# Patient Record
Sex: FEMALE | Race: WHITE | NOT HISPANIC OR LATINO | Employment: FULL TIME | URBAN - METROPOLITAN AREA
[De-identification: names, ages, dates, MRNs, and addresses within clinical notes are randomized per-mention and may not be internally consistent; named-entity substitution may affect disease eponyms.]

---

## 2018-03-28 ENCOUNTER — CONVERSION ENCOUNTER (OUTPATIENT)
Dept: GENERAL RADIOLOGY | Facility: HOSPITAL | Age: 33
End: 2018-03-28

## 2018-11-27 ENCOUNTER — APPOINTMENT (OUTPATIENT)
Dept: GENERAL RADIOLOGY | Facility: HOSPITAL | Age: 33
End: 2018-11-27

## 2018-11-27 ENCOUNTER — HOSPITAL ENCOUNTER (EMERGENCY)
Facility: HOSPITAL | Age: 33
Discharge: HOME | End: 2018-11-27
Admitting: EMERGENCY MEDICINE

## 2018-11-27 VITALS
BODY MASS INDEX: 28.88 KG/M2 | DIASTOLIC BLOOD PRESSURE: 82 MMHG | SYSTOLIC BLOOD PRESSURE: 130 MMHG | OXYGEN SATURATION: 99 % | RESPIRATION RATE: 16 BRPM | HEIGHT: 69 IN | HEART RATE: 73 BPM | TEMPERATURE: 98.2 F | WEIGHT: 195 LBS
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
## 2018-11-27 DIAGNOSIS — S46.912A ELBOW STRAIN, LEFT, INITIAL ENCOUNTER: ICD-10-CM

## 2018-11-27 DIAGNOSIS — M53.3 COCCYX PAIN: ICD-10-CM

## 2018-11-27 PROCEDURE — 72220 X-RAY EXAM SACRUM TAILBONE: CPT

## 2018-11-27 PROCEDURE — 99283 EMERGENCY DEPT VISIT LOW MDM: CPT

## 2018-11-27 PROCEDURE — 73080 X-RAY EXAM OF ELBOW: CPT

## 2018-11-27 RX ORDER — HYDROCODONE BITARTRATE AND ACETAMINOPHEN 5; 325 MG/1; MG/1
1 TABLET ORAL EVERY 6 HOURS PRN
Qty: 12 TABLET | Refills: 0 | Status: SHIPPED | OUTPATIENT
Start: 2018-11-27 | End: 2022-02-07

## 2018-11-27 RX ORDER — HYDROCODONE BITARTRATE AND ACETAMINOPHEN 5; 325 MG/1; MG/1
1 TABLET ORAL ONCE
Status: COMPLETED | OUTPATIENT
Start: 2018-11-27 | End: 2018-11-27

## 2018-11-27 RX ADMIN — HYDROCODONE BITARTRATE AND ACETAMINOPHEN 1 TABLET: 5; 325 TABLET ORAL at 10:31

## 2018-11-27 NOTE — ED NOTES
Pt was at work this morning walking down some stairs to a meeting. She slipped and fell down around 5 steps. She c/o elbow and buttock pain. She states that a co-worker said he saw her hit her head. The patient denies this though, she did report one episode of nausea. Pt given ice pack in triage.      Shauna Llamas RN  11/27/18 5905

## 2018-11-27 NOTE — ED PROVIDER NOTES
Pt presents to the ED c/o left elbow and tailbone pain s/p mechanical fall earlier today.     On exam, pt's musculoskeletal has TTP of L elbow with good ROM..     XR L Elbow and XR Sacrum and Coccyx negative.  Plan for discharge, she is on mobic at home.    MD ATTESTATION NOTE    The CARLOS A and I have discussed this patient's history, physical exam, and treatment plan.  I have reviewed the documentation and personally had a face to face interaction with the patient. I affirm the documentation and agree with the treatment and plan.  The attached note describes my personal findings.    Documentation assistance provided by rebecca Stone for Dr. Harrison. Information recorded by the scribe was done at my direction and has been verified and validated by me.     Magui Stone  11/27/18 3762       Inocente Harrison MD  11/27/18 5383

## 2018-11-27 NOTE — ED PROVIDER NOTES
"EMERGENCY DEPARTMENT ENCOUNTER    CHIEF COMPLAINT  Chief Complaint: Fall  History given by: Patient, Friend  History limited by: N/A  Room Number: 04/04  PMD: Radha Holman, IRIS      HPI:  Pt is a 32 y.o. female who presents to the ED c/o left elbow pain and left buttock pain s/p mechanical fall sustained earlier today. Pt reports that she slipped while pt was walking at work. Pt reports that she sustained mild head injury during the fall, but denies headache, LOC, abdominal pain, chest pain, dyspnea, vomiting, neck pain, dizziness/lightheadedness, and visual difficulties. However, pt has had mild nausea. There are no other complaints at this time.       Duration: Fall occurred earlier today  Timing: Pain is intermittent  Location: Left elbow, left buttock  Radiation: None  Quality: \"aching\"  Intensity/Severity: Moderate  Progression: Pain waxes and wanes  Associated Symptoms: Nausea, head injury   Aggravating Factors: Nothing  Alleviating Factors: Nothing  Previous Episodes: Unknown  Treatment before arrival: Unknown        PAST MEDICAL HISTORY  Active Ambulatory Problems     Diagnosis Date Noted   • No Active Ambulatory Problems     Resolved Ambulatory Problems     Diagnosis Date Noted   • No Resolved Ambulatory Problems     No Additional Past Medical History       PAST SURGICAL HISTORY  History reviewed. No pertinent surgical history.    FAMILY HISTORY  History reviewed. No pertinent family history.    SOCIAL HISTORY  Social History     Socioeconomic History   • Marital status:      Spouse name: Not on file   • Number of children: Not on file   • Years of education: Not on file   • Highest education level: Not on file   Social Needs   • Financial resource strain: Not on file   • Food insecurity - worry: Not on file   • Food insecurity - inability: Not on file   • Transportation needs - medical: Not on file   • Transportation needs - non-medical: Not on file   Occupational History   • Not on file "   Tobacco Use   • Smoking status: Former Smoker   • Smokeless tobacco: Never Used   Substance and Sexual Activity   • Alcohol use: Yes     Comment: occ   • Drug use: No   • Sexual activity: Not on file   Other Topics Concern   • Not on file   Social History Narrative   • Not on file         ALLERGIES  Penicillins        REVIEW OF SYSTEMS  Review of Systems   Constitutional: Negative.    Eyes: Negative for visual disturbance.   Respiratory: Negative for shortness of breath.    Cardiovascular: Negative for chest pain.   Gastrointestinal: Positive for nausea. Negative for abdominal pain and vomiting.   Musculoskeletal: Negative for neck pain.        Left elbow pain, left buttock pain, head injury   Skin: Negative.    Neurological: Negative for dizziness, syncope, weakness, light-headedness, numbness and headaches.   Psychiatric/Behavioral: Negative.    All other systems reviewed and are negative.          PHYSICAL EXAM  ED Triage Vitals   Temp Heart Rate Resp BP SpO2   11/27/18 0935 11/27/18 0935 11/27/18 0935 11/27/18 0944 11/27/18 0935   99.2 °F (37.3 °C) 73 16 130/82 99 %      Temp src Heart Rate Source Patient Position BP Location FiO2 (%)   11/27/18 0935 11/27/18 0935 11/27/18 0944 -- --   Tympanic Monitor Sitting         Physical Exam   Constitutional: She is oriented to person, place, and time. No distress.   HENT:   Head: Normocephalic and atraumatic.   Eyes: EOM are normal. Pupils are equal, round, and reactive to light.   Neck: Normal range of motion. Neck supple.   Cardiovascular: Normal rate and intact distal pulses.   Pulmonary/Chest: Effort normal. She exhibits no tenderness.   Abdominal: Soft. There is no tenderness.   Musculoskeletal: She exhibits tenderness (of the left elbow with decreased ROM secondary to pain).   C-Spine, T-Spine, and L-Spine are nontender.      Neurological: She is alert and oriented to person, place, and time. She has normal sensation and normal strength.   Skin: Skin is warm.    Psychiatric: Mood and affect normal.   Nursing note and vitals reviewed.        RADIOLOGY  XR Sacrum & Coccyx     FINDINGS: Frontal and lateral projections of the sacrum and coccyx  demonstrate no evidence for acute fracture or bony malalignment.      XR Elbow 3+ View Left     FINDINGS: 2 radiographic views of the left elbow demonstrate no evidence  for acute fracture or bony malalignment.          I ordered the above noted radiological studies and reviewed the images on the PACS system.          PROGRESS AND CONSULTS    10:34 AM:  Henry ordered to treat for pain. Left elbow X-Ray and sacral X-Ray ordered for further evaluation.     11:28 AM:  Rechecked pt. Pt is resting comfortably and appears in no acute distress. Informed pt that her sacral X-Ray and left elbow X-Ray are negative acute. Pt will be prescribed with rx for small amount of pain medicine for discomfort. Pt was advised to f/u with PMD. RTER warnings given. Pt agrees with plan for discharge.    11:30 AM:  Reviewed pt's history and workup with Dr. Harrison (ER physician). After a bedside evaluation, Dr. Harrison agrees with the plan of care.    11:32 AM:  Cirilo report [id number] reviewed.  Risks, benefits, alternatives discussed with patient. Pt consents to treatment and agrees to follow up with PMD tomorrow for further care and any other prescriptions.         COURSE & MEDICAL DECISION MAKING  Pertinent Labs and/or imaging studies that were ordered and reviewed are noted above. Results were reviewed/discussed with the patient and they were also made aware of online assess. Pt also made aware that some labs, such as cultures, will not be resulted during ER visit and follow up with PMD is necessary.         MEDICATIONS GIVEN IN ER  Medications   HYDROcodone-acetaminophen (NORCO) 5-325 MG per tablet 1 tablet (1 tablet Oral Given 11/27/18 1031)             DISPOSITION VITALS  /82 (Patient Position: Sitting)   Pulse 73   Temp 98.2 °F (36.8 °C) (Oral)    "Resp 16   Ht 175.3 cm (69\")   Wt 88.5 kg (195 lb)   SpO2 99%   BMI 28.80 kg/m²           DIAGNOSIS  Final diagnoses:   Fall, initial encounter   Elbow strain, left, initial encounter   Coccyx pain             DISPOSITION  Pt discharged.    DISCHARGE    Patient discharged in stable condition.    Reviewed implications of results, diagnosis, meds, responsibility to follow up, warning signs and symptoms of possible worsening, potential complications and reasons to return to ER.    Patient/Family voiced understanding of above instructions.    Discussed plan for discharge, as there is no emergent indication for admission. Pt/family is agreeable and understands need for follow up and repeat testing. Pt is aware that discharge does not mean that nothing is wrong but it indicates no emergency is present that requires admission and they must continue care with follow-up as given below or physician of their choice.     FOLLOW-UP  Radha Holman, IRIS  [address] [facility] [address]  [place] [address]  [phone]    Schedule an appointment as soon as possible for a visit            Medication List      New Prescriptions    HYDROcodone-acetaminophen 5-325 MG per tablet  Commonly known as:  NORCO  Take 1 tablet by mouth Every 6 (Six) Hours As Needed for Severe Pain .                    I personally reviewed the past medical history, past surgical history, social history, family history, current medications and allergies as they appear in this chart.  The scribe's note accurately reflects the work and decisions made by me.     Documentation assistance provided by rebecca Boateng for IRIS Vance.  Information recorded by the scribe was done at my direction and has been verified and validated by me.        Oswaldo Boateng  11/27/18 1148       Viki Palma APRN  11/27/18 1416    "

## 2020-04-20 ENCOUNTER — OFFICE VISIT CONVERTED (OUTPATIENT)
Dept: OTHER | Facility: HOSPITAL | Age: 35
End: 2020-04-20
Attending: NURSE PRACTITIONER

## 2021-03-08 ENCOUNTER — OFFICE VISIT CONVERTED (OUTPATIENT)
Dept: FAMILY MEDICINE CLINIC | Facility: CLINIC | Age: 36
End: 2021-03-08
Attending: NURSE PRACTITIONER

## 2021-04-19 ENCOUNTER — OFFICE VISIT CONVERTED (OUTPATIENT)
Dept: FAMILY MEDICINE CLINIC | Facility: CLINIC | Age: 36
End: 2021-04-19
Attending: NURSE PRACTITIONER

## 2021-05-10 NOTE — H&P
History and Physical      Patient Name: Cecilia Jay   Patient ID: [id number]   Sex: Female   YOB: 1985    Primary Care Provider: Radha SIMMONS   Referring Provider: Radha SIMMONS    Visit Date: March 8, 2021    Provider: IRIS Aggarwal   Location: [facility]   Location Address: [address] [place] [address], [address]  [place], [place]  [id number]   Location Phone: [phone]          Chief Complaint  · New patient - Establish care  · wrist pain  · Annual Physical Exam  · focusing difficulties      History Of Present Illness  Cecilia Jay is a 35 year old female who presents for evaluation and treatment of:      Annual Physical Exam  New patient to re-establish new primary care.  Patient previously seen at [facility].    Patient has no significant medical history.    Patient complaining of bilateral wrist pain.  Patient states she is on disability from the  for same d/t repetative use.  Patient states she is loosing strength in both hands.  Patient has not had previous treatment or surgery on her wrists. Pt admits she does do a significant amount of typing daily, she states she also has numbness and tingling    Patient complaining of not being able to stay focused or concentrate when people are talking to her.  Patient states she also has difficulty staying focused with her work as an  as well.  Patient is requesting to be tested/evaluated for ADD/ADHD.     Pap: 2019, patient is scheduled with [facility] for annual pap this afternoon.         Past Surgical History  Procedure Name Date Notes   breast reduction --  --    Laproscopic freeing of intestinal adhesions --  --    Tubal ligation --  --          Allergy List  Allergen Name Date Reaction Notes   PENICILLINS --  --  --        Allergies Reconciled  Family Medical History  Disease Name Relative/Age Notes   Family history of Alzheimer's disease  --          Social History  Finding Status Start/Stop Quantity  "Notes   Alcohol Current some day --/-- 8 /week --    Sedentary --  --/-- --  --    Tobacco Former 12/23 1 ppd --          Immunizations  NameDate Admin Mfg Trade Name Lot Number Route Inj VIS Given VIS Publication   Recqbnzgs55/10/2020 [employer]  NE NE 03/08/2021    Comments:          Review of Systems  · Constitutional  o Admits  o : trouble focusing  o Denies  o : fatigue  · Eyes  o Denies  o : blurred vision, changes in vision  · HENT  o Denies  o : headaches  · Cardiovascular  o Denies  o : chest pain, irregular heart beats, rapid heart rate, dyspnea on exertion  · Respiratory  o Denies  o : shortness of breath, wheezing, cough  · Gastrointestinal  o Denies  o : nausea, vomiting, diarrhea, constipation, abdominal pain, blood in stools, melena  · Genitourinary  o Denies  o : frequency, dysuria, hematuria  · Integument  o Denies  o : rash, new skin lesions  · Musculoskeletal  o Admits  o : wrist pain  o Denies  o : joint pain, joint swelling, muscle pain  · Endocrine  o Denies  o : polyuria, polydipsia      Vitals  Date Time BP Position Site L\R Cuff Size HR RR TEMP (F) WT  HT  BMI kg/m2 BSA m2 O2 Sat FR L/min FiO2 HC       03/08/2021 10:49 /83 Sitting    60 - R  97.9 241lbs 6oz 5'  9\" 35.64 2.31 99 %  21%          Physical Examination  · Constitutional  o Appearance  o : well developed, well-nourished, no acute distress  · Head and Face  o Head  o : normocephalic, atraumatic  · Neck  o Inspection/Palpation  o : normal appearance, no masses or tenderness, trachea midline  o Thyroid  o : gland size normal, nontender, no nodules or masses present on palpation  · Respiratory  o Respiratory Effort  o : breathing unlabored  o Inspection of Chest  o : chest rise symmetric bilaterally  o Auscultation of Lungs  o : clear to auscultation bilaterally throughout inspiration and expiration  · Cardiovascular  o Heart  o :   § Auscultation of Heart  § : regular rate and rhythm, no murmurs, gallops or " rubs  o Peripheral Vascular System  o :   § Extremities  § : no edema  · Lymphatic  o Neck  o : no cervical lymphadenopathy, no supraclavicular lymphadenopathy  · Psychiatric  o Mood and Affect  o : mood normal, affect appropriate     bilateral wrists-positive Tinels sign, negative phalens test,  strength equal, positive radial pulses.           Assessment  · Screening for depression     V79.0/Z13.89  · Annual physical exam     V70.0/Z00.00  · Establishing care with new doctor, encounter for     V65.8/Z76.89  · Routine lab draw     V72.60/Z01.89  · Left wrist pain     719.43/M25.532  · Right wrist pain     719.43/M25.531  · Screening for thyroid disorder     V77.0/Z13.29  · Screening for cardiovascular condition     V81.2/Z13.6  · Bilateral hand numbness     782.0/R20.0  check bilateral upper extremity nerve conduction studies  · Attention or concentration deficit     799.51/R41.840  refer to psych for formal evaluation    Problems Reconciled  Plan  · Orders  o ACO-18: Positive screen for clinical depression using a standardized tool and a follow-up plan documented () - V79.0/Z13.89 - 03/08/2021   13  o Physical, Primary Care Panel (CBC, CMP, Lipid, TSH) [facility] (97827, 58213, [id number], 57531) - V77.0/Z13.29, V81.2/Z13.6, V65.8/Z76.89, V72.60/Z01.89 - 03/08/2021  o ACO-14: Influenza immunization administered or previously received [facility] () - - 03/08/2021  o ACO-39: Current medications updated and reviewed (, 1159F) - - 03/08/2021  o EMG/NCV of Upper Extremities Bilaterally (30102) - 719.43/M25.532, 719.43/M25.531, 782.0/R20.0 - 03/08/2021  o PSYCHOLOGY CONSULTATION (PSYCO) - 799.51/R41.840 - 03/08/2021   formal ADD evaluation  · Medications  o Medications have been Reconciled  o Transition of Care or Provider Policy  · Instructions  o Depression Screen completed and scanned into the EMR under the designated folder within the patient's documents.  o Today's PHQ-9 result is _13__denies depression, states she  is having trouble focusing, will refer to psych provider  o Reviewed health maintenance flowsheet and updated information. Orders were placed and/or patient's response was documented.  o Patient was educated/instructed on their diagnosis, treatment and medications prior to discharge from the clinic today.  o Call the office with any concerns or questions.  o Electronically Identified Patient Education Materials Provided Electronically  · Disposition  o Follow Up PRN            Electronically Signed by: IRIS Aggarwal -Author on March 11, 2021 08:13:33 PM

## 2021-05-10 NOTE — H&P
History and Physical      Patient Name: Cecilia Jay   Patient ID: [id number]   Sex: Female   YOB: 1985        Visit Date: April 20, 2020    Provider: IRIS Taylor   [facility]   Location Address: [address] [place] [address]  [id number]   Location Phone: [phone]          Chief Complaint  · Evaluation for Respiratory Virus      History Of Present Illness  Cecilia Jya is a 34 year old female who presents today to the clinic for evaluation of a respiratory virus.   Date of Onset: 04/14/2020   What Symptoms: cough, fatigue , fever, and shortness of breath   Quality of Symptoms: dry cough that has worsened since Tuesday   Anything make it worse or better: Mucinex and Zyrtec helps some   Severity of Symptoms: moderate   Any known Exposure to COVID-19: no      Patient presents the respiratory virus evaluation center with cough, fatigue, mild fever, shortness of breath, sinus pressure since Tuesday.  Patient reports the fevers been up to 100 degrees.  Patient has been taking Mucinex and Zyrtec over-the-counter with minimal relief.  No known exposure COVID-19 or other illness.       Medication List  Mucinex 600 mg oral tablet extended release 12hr         Allergy List  PENICILLINS       Allergies Reconciled  Review of Systems  · Constitutional  o Admits  o : fatigue, fever  o Denies  o : weight gain, weight loss  · HENT  o Admits  o : sinus pain, nasal congestion, nasal discharge  o Denies  o : vertigo, recent head injury, sore throat  · Respiratory  o Admits  o : shortness of breath, cough  o Denies  o : asthma  · Gastrointestinal  o Denies  o : nausea, vomiting, diarrhea, constipation, abdominal pain  · Integument  o Denies  o : rash  · Neurologic  o Denies  o : headache      Vitals  Date Time BP Position Site L\R Cuff Size HR RR TEMP (F) WT  HT  BMI kg/m2 BSA m2 O2 Sat HC       04/20/2020 02:28 PM      94 - R  97.5     98 %          Physical  Examination  · Constitutional  o Appearance  o : no acute distress, well-nourished  · Head and Face  o Head  o :   § Inspection  § : atraumatic, normocephalic  · Eyes  o Eyes  o : extraocular movements intact, no scleral icterus, no conjunctival injection  · Ears, Nose, Mouth and Throat  o Ears  o :   § External Ears  § : normal  § Otoscopic Examination  § : normal tympanic membrane exam  o Nose  o :   § Intranasal Exam  § : nasal mucosa inflammation present, frontal sinus tenderness to percussion and palpation  o Oral Cavity  o :   § Oral Mucosa  § : moist mucous membranes  o Throat  o :   § Oropharynx  § : normal tonsils, normal oropharynx, clear drainage  · Respiratory  o Respiratory Effort  o : breathing comfortably, symmetric chest rise  o Auscultation of Lungs  o : clear to asculatation bilaterally, no wheezes, rales, or rhonchii  · Cardiovascular  o Heart  o :   § Auscultation of Heart  § : regular rate and rhythm, no murmurs, rubs, or gallops  o Peripheral Vascular System  o :   § Extremities  § : no edema  · Lymphatic  o Neck  o : no lymphadenopathy present  o Supraclavicular Nodes  o : no supraclavicular nodes  · Skin and Subcutaneous Tissue  o General Inspection  o : normal inspection  · Neurologic  o Mental Status Examination  o :   § Orientation  § : grossly oriented to person, place and time  o Gait and Station  o :   § Gait Screening  § : normal gait  · Psychiatric  o General  o : normal mood and affect              Assessment  · Cough     786.2/R05  · Sinusitis     473.9/J32.9  Patient prescribed Omnicef and Mucinex at this time for sinus infection. Educated on supportive care to include rest, fluids, Tylenol and Motrin as needed. Educated patient to call or return to clinic with any new or worsening symptoms.    Problems Reconciled  Plan  · Medications  o cefdinir 300 mg oral capsule   SIG: take 1 capsule (300 mg) by oral route every 12 hours for 10 days   DISP: (20) capsules with 0  refills  Prescribed on 04/20/2020     o Medications have been Reconciled  o Transition of Care or Provider Policy  · Instructions  o Chronic conditions reviewed and taken into consideration for today's treatment plan.   o Plan of Care:   o Patient instructed to seek medical attention urgently for new or worsening symptoms.  o Patient was educated on their diagnosis, treatment, and medications today.   o Recommend self monitoring. Instructions given.   o Self-monitoring for 14 days. Instructions given.  o Stay home until without fever for 72 hours without using fever-reducing medications and other symptoms are gone.  o Electronically Identified Patient Education Materials Provided Electronically  · Disposition  o Call or Return if symptoms worsen or persist.  o Meds sent to pharmacy  o As Needed for Follow Up            Electronically Signed by: Leti De La Torre APRN -Author on April 20, 2020 02:48:35 PM

## 2021-05-14 VITALS
HEIGHT: 69 IN | SYSTOLIC BLOOD PRESSURE: 117 MMHG | WEIGHT: 230 LBS | DIASTOLIC BLOOD PRESSURE: 79 MMHG | TEMPERATURE: 98.2 F | HEART RATE: 74 BPM | OXYGEN SATURATION: 98 % | RESPIRATION RATE: 18 BRPM | BODY MASS INDEX: 34.07 KG/M2

## 2021-05-14 VITALS
SYSTOLIC BLOOD PRESSURE: 117 MMHG | TEMPERATURE: 97.9 F | DIASTOLIC BLOOD PRESSURE: 83 MMHG | WEIGHT: 241.37 LBS | OXYGEN SATURATION: 99 % | HEART RATE: 60 BPM | BODY MASS INDEX: 35.75 KG/M2 | HEIGHT: 69 IN

## 2021-05-14 NOTE — PROGRESS NOTES
Progress Note      Patient Name: Cecilia Jay   Patient ID: [id number]   Sex: Female   YOB: 1985    Primary Care Provider: Radha SIMMONS   Referring Provider: Radha SIMMONS    Visit Date: April 19, 2021    Provider: IRIS Parker   Location: [facility]   Location Address: [address] [place] [address], [address]  [place], [place]  [id number]   Location Phone: [phone]          Chief Complaint  · poss sinus infection      History Of Present Illness  Cecilia Jay is a 35 year old female who presents for evaluation and treatment of:      Patient is here for a possible sinus infection. Patient thought that she had allergies last weekend, but now is coughing up green mucus (starting in the middle of last week). She has also had some sinus congestion and drainage, blowing out green drainage, mostly in the mornings.  Patient admits that she did have a low grade fever last week and took tylenol for it.   Patient denies any SOA, nausea, vomiting. Patient denies any COVID-19 exposure.    Patient has been taking Mucinex DM, Claritin, Theraflu, etc.     Patient has not had a flu vaccine.       Past Surgical History  Procedure Name Date Notes   breast reduction --  --    Laproscopic freeing of intestinal adhesions --  --    Tubal ligation --  --          Medication List  Name Date Started Instructions   Mucinex DM  mg oral tablet extended release 12 hr  take 1 tablet by oral route every 12 hours as needed         Allergy List  Allergen Name Date Reaction Notes   PENICILLINS --  --  --        Allergies Reconciled  Family Medical History  Disease Name Relative/Age Notes   Family history of Alzheimer's disease  --          Social History  Finding Status Start/Stop Quantity Notes   Alcohol Current some day --/-- 8 /week --    Sedentary --  --/-- --  --    Tobacco Former 12/23 1 ppd --          Immunizations  NameDate Admin Mfg Trade Name Lot Number Route Inj VIS Given VIS  "Publication   [id number] [employer] Fluzone Quadrivalent  NE NE 03/08/2021    Comments:          Review of Systems  · Constitutional  o Denies  o : fever, fatigue, weight loss, weight gain  · Cardiovascular  o Denies  o : lower extremity edema, claudication, chest pressure, palpitations  · Respiratory  o Admits  o : cough  o Denies  o : shortness of breath, wheezing, hemoptysis, dyspnea on exertion  · Gastrointestinal  o Denies  o : nausea, vomiting, diarrhea, constipation, abdominal pain      Vitals  Date Time BP Position Site L\R Cuff Size HR RR TEMP (F) WT  HT  BMI kg/m2 BSA m2 O2 Sat FR L/min FiO2 HC       04/19/2021 01:47 /79 Sitting    74 - R 18 98.2 230lbs 0oz 5'  9\" 33.96 2.25 98 %  21%          Physical Examination  · Constitutional  o Appearance  o : well developed, well-nourished, no acute distress  · Head and Face  o Head  o : normocephalic, atraumatic  · Ears, Nose, Mouth and Throat  o Ears  o :   § External Ears  § : external auditory canal appearance normal, no discharge present  § Otoscopic Examination  § : tympanic membranes pearly white/gray bilaterally  o Nose  o :   § External Nose  § : no lesions noted  § Nasopharynx  § : swollen, clear d/c noted   o Oral Cavity  o :   § Oral Mucosa  § : oral mucosa light pink  o Throat  o :   § Oropharynx  § : tonsils without exudate, no palatal petechiae, thick PND noted   · Neck  o Inspection/Palpation  o : normal appearance, no masses or tenderness, trachea midline  o Thyroid  o : gland size normal, nontender, no nodules or masses present on palpation  · Respiratory  o Respiratory Effort  o : breathing unlabored  o Inspection of Chest  o : chest rise symmetric bilaterally  o Auscultation of Lungs  o : slight expiratory wheezing noted   · Cardiovascular  o Heart  o :   § Auscultation of Heart  § : regular rate and rhythm, no murmurs, gallops or rubs  o Peripheral Vascular System  o :   § Extremities  § : no edema  · Lymphatic  o Neck  o : no cervical " lymphadenopathy, no supraclavicular lymphadenopathy  · Psychiatric  o Mood and Affect  o : mood normal, affect appropriate          Assessment  · Allergic rhinitis due to allergen     477.9/J30.9  · Cough     786.2/R05  · Upper respiratory infection     465.9/J06.9  · Sinus drainage     478.19/J34.89  · Sinus congestion     478.19/R09.81      Plan  · Orders  o ACO-14: Influenza immunization was not administered for reasons documented [facility] () - - 04/19/2021   pt declined  o ACO-39: Current medications updated and reviewed (1159F, ) - - 04/19/2021  · Medications  o Medrol (Scotty) 4 mg oral tablets,dose pack   SIG: take by oral route as directed per package instructions   DISP: (1) Package with 0 refills  Prescribed on 04/19/2021     o azithromycin 250 mg oral tablet   SIG: take 2 tablets (500 mg) by oral route once daily for 1 day then 1 tablet (250 mg) by oral route once daily for 4 days   DISP: (6) Tablet with 0 refills  Prescribed on 04/19/2021     o albuterol sulfate 90 mcg/actuation inhalation HFA aerosol inhaler   SIG: inhale 2 puffs (180 mcg) by inhalation route every 4-6 hours as needed   DISP: (1) Inhaler with 0 refills  Prescribed on 04/19/2021     · Instructions  o Take all medications as prescribed/directed.  o Patient was educated/instructed on their diagnosis, treatment and medications prior to discharge from the clinic today.  o Patient instructed to seek medical attention urgently for new or worsening symptoms.  o Call the office with any concerns or questions.  o Chronic conditions reviewed and taken into consideration for today's treatment plan.  · Disposition  o Call or Return if symptoms worsen or persist.  o Follow Up PRN            Electronically Signed by: IRIS Parker -Author on April 19, 2021 02:21:15 PM

## 2021-05-15 VITALS — OXYGEN SATURATION: 98 % | HEART RATE: 94 BPM | TEMPERATURE: 97.5 F

## 2022-02-07 ENCOUNTER — OFFICE VISIT (OUTPATIENT)
Dept: FAMILY MEDICINE CLINIC | Facility: CLINIC | Age: 37
End: 2022-02-07

## 2022-02-07 ENCOUNTER — LAB (OUTPATIENT)
Dept: LAB | Facility: HOSPITAL | Age: 37
End: 2022-02-07

## 2022-02-07 VITALS
HEIGHT: 69 IN | HEART RATE: 72 BPM | OXYGEN SATURATION: 98 % | SYSTOLIC BLOOD PRESSURE: 110 MMHG | DIASTOLIC BLOOD PRESSURE: 75 MMHG | WEIGHT: 234 LBS | BODY MASS INDEX: 34.66 KG/M2

## 2022-02-07 DIAGNOSIS — Z13.29 SCREENING FOR THYROID DISORDER: ICD-10-CM

## 2022-02-07 DIAGNOSIS — Z13.220 SCREENING FOR LIPID DISORDERS: ICD-10-CM

## 2022-02-07 DIAGNOSIS — Z00.00 ANNUAL PHYSICAL EXAM: Primary | ICD-10-CM

## 2022-02-07 DIAGNOSIS — R20.0 BILATERAL HAND NUMBNESS: ICD-10-CM

## 2022-02-07 DIAGNOSIS — Z11.59 NEED FOR HEPATITIS C SCREENING TEST: ICD-10-CM

## 2022-02-07 DIAGNOSIS — Z00.00 ANNUAL PHYSICAL EXAM: ICD-10-CM

## 2022-02-07 LAB
ALBUMIN SERPL-MCNC: 4.3 G/DL (ref 3.5–5.2)
ALBUMIN/GLOB SERPL: 1.7 G/DL
ALP SERPL-CCNC: 56 U/L (ref 39–117)
ALT SERPL W P-5'-P-CCNC: 17 U/L (ref 1–33)
ANION GAP SERPL CALCULATED.3IONS-SCNC: 9.6 MMOL/L (ref 5–15)
AST SERPL-CCNC: 14 U/L (ref 1–32)
BASOPHILS # BLD AUTO: 0.04 10*3/MM3 (ref 0–0.2)
BASOPHILS NFR BLD AUTO: 0.5 % (ref 0–1.5)
BILIRUB SERPL-MCNC: 0.4 MG/DL (ref 0–1.2)
BUN SERPL-MCNC: 13 MG/DL (ref 6–20)
BUN/CREAT SERPL: 14.9 (ref 7–25)
CALCIUM SPEC-SCNC: 9 MG/DL (ref 8.6–10.5)
CHLORIDE SERPL-SCNC: 104 MMOL/L (ref 98–107)
CHOLEST SERPL-MCNC: 169 MG/DL (ref 0–200)
CO2 SERPL-SCNC: 25.4 MMOL/L (ref 22–29)
CREAT SERPL-MCNC: 0.87 MG/DL (ref 0.57–1)
DEPRECATED RDW RBC AUTO: 45.6 FL (ref 37–54)
EOSINOPHIL # BLD AUTO: 0.09 10*3/MM3 (ref 0–0.4)
EOSINOPHIL NFR BLD AUTO: 1 % (ref 0.3–6.2)
ERYTHROCYTE [DISTWIDTH] IN BLOOD BY AUTOMATED COUNT: 12.8 % (ref 12.3–15.4)
GFR SERPL CREATININE-BSD FRML MDRD: 73 ML/MIN/1.73
GLOBULIN UR ELPH-MCNC: 2.6 GM/DL
GLUCOSE SERPL-MCNC: 98 MG/DL (ref 65–99)
HCT VFR BLD AUTO: 39.3 % (ref 34–46.6)
HCV AB SER DONR QL: NORMAL
HDLC SERPL-MCNC: 57 MG/DL (ref 40–60)
HGB BLD-MCNC: 12.8 G/DL (ref 12–15.9)
IMM GRANULOCYTES # BLD AUTO: 0.02 10*3/MM3 (ref 0–0.05)
IMM GRANULOCYTES NFR BLD AUTO: 0.2 % (ref 0–0.5)
LDLC SERPL CALC-MCNC: 93 MG/DL (ref 0–100)
LDLC/HDLC SERPL: 1.59 {RATIO}
LYMPHOCYTES # BLD AUTO: 1.63 10*3/MM3 (ref 0.7–3.1)
LYMPHOCYTES NFR BLD AUTO: 19 % (ref 19.6–45.3)
MCH RBC QN AUTO: 31.1 PG (ref 26.6–33)
MCHC RBC AUTO-ENTMCNC: 32.6 G/DL (ref 31.5–35.7)
MCV RBC AUTO: 95.6 FL (ref 79–97)
MONOCYTES # BLD AUTO: 0.77 10*3/MM3 (ref 0.1–0.9)
MONOCYTES NFR BLD AUTO: 9 % (ref 5–12)
NEUTROPHILS NFR BLD AUTO: 6.03 10*3/MM3 (ref 1.7–7)
NEUTROPHILS NFR BLD AUTO: 70.3 % (ref 42.7–76)
NRBC BLD AUTO-RTO: 0 /100 WBC (ref 0–0.2)
PLATELET # BLD AUTO: 231 10*3/MM3 (ref 140–450)
PMV BLD AUTO: 12 FL (ref 6–12)
POTASSIUM SERPL-SCNC: 4.4 MMOL/L (ref 3.5–5.2)
PROT SERPL-MCNC: 6.9 G/DL (ref 6–8.5)
RBC # BLD AUTO: 4.11 10*6/MM3 (ref 3.77–5.28)
SODIUM SERPL-SCNC: 139 MMOL/L (ref 136–145)
TRIGL SERPL-MCNC: 108 MG/DL (ref 0–150)
TSH SERPL DL<=0.05 MIU/L-ACNC: 1.57 UIU/ML (ref 0.27–4.2)
VLDLC SERPL-MCNC: 19 MG/DL (ref 5–40)
WBC NRBC COR # BLD: 8.58 10*3/MM3 (ref 3.4–10.8)

## 2022-02-07 PROCEDURE — 80050 GENERAL HEALTH PANEL: CPT

## 2022-02-07 PROCEDURE — 36415 COLL VENOUS BLD VENIPUNCTURE: CPT

## 2022-02-07 PROCEDURE — 86803 HEPATITIS C AB TEST: CPT

## 2022-02-07 PROCEDURE — 80061 LIPID PANEL: CPT

## 2022-02-07 PROCEDURE — 99395 PREV VISIT EST AGE 18-39: CPT | Performed by: NURSE PRACTITIONER

## 2022-02-07 NOTE — PROGRESS NOTES
"Chief Complaint  Annual Exam    Subjective          Cecilia Jay presents to [facility]  History of Present Illness  Pt here for annual physical exam with screening labwork.    She is c/o bilateral hand numbness. She has had this for quite a while and was initially referred for EMG study but patient states she never got a notice about the test and she never followed up.  She states the right hand is worse and she is starting to lose  strength in her right hand.      History reviewed. No pertinent past medical history.      Allergies   Allergen Reactions   • Penicillins Anaphylaxis          History reviewed. No pertinent surgical history.       Social History     Tobacco Use   • Smoking status: Former Smoker   • Smokeless tobacco: Never Used   Substance Use Topics   • Alcohol use: Yes     Comment: occ         History reviewed. No pertinent family history.       Current Outpatient Medications on File Prior to Visit   Medication Sig   • [DISCONTINUED] HYDROcodone-acetaminophen (NORCO) 5-325 MG per tablet Take 1 tablet by mouth Every 6 (Six) Hours As Needed for Severe Pain .     No current facility-administered medications on file prior to visit.         Immunization History   Administered Date(s) Administered   • COVID-19 (PFIZER) PURPLE CAP 04/18/2021, 05/10/2021, 11/18/2021         /75   Pulse 72   Ht 175.3 cm (69\")   Wt 106 kg (234 lb)   SpO2 98%   BMI 34.56 kg/m²             Physical Exam  Vitals reviewed.   Constitutional:       Appearance: Normal appearance. She is well-developed.   HENT:      Head: Normocephalic and atraumatic.      Right Ear: External ear normal.      Left Ear: External ear normal.      Mouth/Throat:      Pharynx: No oropharyngeal exudate.   Eyes:      Conjunctiva/sclera: Conjunctivae normal.      Pupils: Pupils are equal, round, and reactive to light.   Cardiovascular:      Rate and Rhythm: Normal rate and regular rhythm.      Heart sounds: No " murmur heard.  No friction rub. No gallop.    Pulmonary:      Effort: Pulmonary effort is normal.      Breath sounds: Normal breath sounds. No wheezing or rhonchi.   Abdominal:      General: Abdomen is flat. Bowel sounds are normal.      Palpations: Abdomen is soft.   Musculoskeletal:      Right wrist: Snuff box tenderness present.      Right hand: Decreased strength.      Left hand: Normal.      Comments: Negative Phalen's and Tinel's sign   Skin:     General: Skin is warm and dry.   Neurological:      Mental Status: She is alert and oriented to person, place, and time.      Cranial Nerves: No cranial nerve deficit.   Psychiatric:         Mood and Affect: Mood and affect normal.         Behavior: Behavior normal.         Thought Content: Thought content normal.         Judgment: Judgment normal.             Result Review :                           Assessment and Plan      Diagnoses and all orders for this visit:    1. Annual physical exam (Primary)  -     Comprehensive Metabolic Panel; Future  -     CBC & Differential; Future    2. Need for hepatitis C screening test  -     Hepatitis C antibody; Future    3. Screening for lipid disorders  -     Lipid Panel; Future    4. Screening for thyroid disorder  -     TSH; Future    5. Bilateral hand numbness  -     EMG & Nerve Conduction Test; Future    The patient is advised to attempt to lose weight and return for routine annual checkups.            Follow Up     Return for Next scheduled follow up.    Patient was given instructions and counseling regarding her condition or for health maintenance advice. Please see specific information pulled into the AVS if appropriate.

## 2022-02-08 ENCOUNTER — TELEPHONE (OUTPATIENT)
Dept: FAMILY MEDICINE CLINIC | Facility: CLINIC | Age: 37
End: 2022-02-08

## 2022-02-15 ENCOUNTER — OFFICE VISIT (OUTPATIENT)
Dept: FAMILY MEDICINE CLINIC | Facility: CLINIC | Age: 37
End: 2022-02-15

## 2022-02-15 VITALS
TEMPERATURE: 98.4 F | DIASTOLIC BLOOD PRESSURE: 71 MMHG | WEIGHT: 233.4 LBS | BODY MASS INDEX: 34.57 KG/M2 | SYSTOLIC BLOOD PRESSURE: 108 MMHG | HEART RATE: 70 BPM | HEIGHT: 69 IN | OXYGEN SATURATION: 98 %

## 2022-02-15 DIAGNOSIS — Z01.419 ENCOUNTER FOR ANNUAL ROUTINE GYNECOLOGICAL EXAMINATION: Primary | ICD-10-CM

## 2022-02-15 DIAGNOSIS — Z12.4 SCREENING FOR CERVICAL CANCER: ICD-10-CM

## 2022-02-15 DIAGNOSIS — R20.0 BILATERAL HAND NUMBNESS: ICD-10-CM

## 2022-02-15 PROCEDURE — G0123 SCREEN CERV/VAG THIN LAYER: HCPCS

## 2022-02-15 PROCEDURE — 99395 PREV VISIT EST AGE 18-39: CPT | Performed by: NURSE PRACTITIONER

## 2022-02-15 PROCEDURE — 87624 HPV HI-RISK TYP POOLED RSLT: CPT | Performed by: NURSE PRACTITIONER

## 2022-02-15 NOTE — PROGRESS NOTES
"Subjective   History of Present Illness    Cecilia Jay is a 36 y.o. female who presents for annual exam.  WWE with pap smear  Obstetric History:  OB History        2    Para   1    Term                AB   1    Living           SAB        IAB        Ectopic   1    Molar        Multiple        Live Births                   Menstrual History:  Menarche Age: 10 years  Patient's last menstrual period was 2022.       Sexual History:         Current contraception: none  History of abnormal Pap smear: yes - As a teenager had HPV  SRIDEVI exposure in utero: no  Received Gardasil immunization: yes - at age 24  Perform regular self breast exam: no  Family history of uterine or ovarian cancer: no  Family History of cervical cancer: no  Family History of colon cancer/colon polyps: no  Regular self breast exam: no  History of abnormal mammogram: no  Family history of breast cancer: no  History of abnormal lipids: no        Review of Systems   Neurological: Positive for numbness.     She continues to complain of bilateral hand numbness.  She was previously referred for EMG and nerve conduction study approximately .  She states she is still not been contacted regarding that appointment.  She is requesting another referral.   Musculoskeletal:positive for bilateral hand numbness     Objective   Physical Exam    /71 (BP Location: Left arm, Patient Position: Sitting, Cuff Size: Adult)   Pulse 70   Temp 98.4 °F (36.9 °C) (Oral)   Ht 175.3 cm (69\")   Wt 106 kg (233 lb 6.4 oz)   LMP 2022   SpO2 98%   BMI 34.47 kg/m²     General:   alert, appears stated age and cooperative   Heart: regular rate and rhythm, S1, S2 normal, no murmur, click, rub or gallop   Lungs: clear to auscultation bilaterally   Abdomen: soft, non-tender, without masses or organomegaly   Breast: inspection negative, no nipple discharge or bleeding, no masses or nodularity palpable   Vulva: normal   Vagina: normal mucosa, " normal discharge   Cervix: retroverted   Uterus: normal size   Adnexa: normal adnexa and no mass, fullness, tenderness     Assessment/Plan   Diagnoses and all orders for this visit:    1. Encounter for annual routine gynecological examination (Primary)  -     IGP, Aptima HPV, Rfx 16 / 18,45; Future  -     IGP, Aptima HPV, Rfx 16 / 18,45    2. Screening for cervical cancer  -     IGP, Aptima HPV, Rfx 16 / 18,45; Future  -     IGP, Aptima HPV, Rfx 16 / 18,45    3. Bilateral hand numbness  -     EMG & Nerve Conduction Test; Future        All questions answered.  Await pap smear results.  Breast self exam technique reviewed and patient encouraged to perform self-exam monthly.  Discussed healthy lifestyle modifications.  Follow up as needed.  Thin prep Pap smear.

## 2022-02-18 LAB
CYTOLOGIST CVX/VAG CYTO: NORMAL
CYTOLOGY CVX/VAG DOC CYTO: NORMAL
CYTOLOGY CVX/VAG DOC THIN PREP: NORMAL
DX ICD CODE: NORMAL
HIV 1 & 2 AB SER-IMP: NORMAL
HPV I/H RISK 4 DNA CVX QL PROBE+SIG AMP: NEGATIVE
OTHER STN SPEC: NORMAL
STAT OF ADQ CVX/VAG CYTO-IMP: NORMAL

## 2022-02-22 ENCOUNTER — TELEPHONE (OUTPATIENT)
Dept: FAMILY MEDICINE CLINIC | Facility: CLINIC | Age: 37
End: 2022-02-22

## 2022-02-22 NOTE — TELEPHONE ENCOUNTER
----- Message from IRIS Godinez sent at 2/21/2022  7:43 AM EST -----  Normal Pap, negative for HPV.  Repeat in 3 years.

## 2022-08-17 ENCOUNTER — OFFICE VISIT (OUTPATIENT)
Dept: FAMILY MEDICINE CLINIC | Facility: CLINIC | Age: 37
End: 2022-08-17

## 2022-08-17 VITALS
BODY MASS INDEX: 33.56 KG/M2 | OXYGEN SATURATION: 99 % | HEART RATE: 51 BPM | WEIGHT: 226.6 LBS | SYSTOLIC BLOOD PRESSURE: 122 MMHG | TEMPERATURE: 98.2 F | DIASTOLIC BLOOD PRESSURE: 81 MMHG | HEIGHT: 69 IN

## 2022-08-17 DIAGNOSIS — M25.511 ACUTE PAIN OF RIGHT SHOULDER: ICD-10-CM

## 2022-08-17 DIAGNOSIS — M54.2 NECK PAIN: Primary | ICD-10-CM

## 2022-08-17 PROCEDURE — 99213 OFFICE O/P EST LOW 20 MIN: CPT | Performed by: NURSE PRACTITIONER

## 2022-08-17 NOTE — PROGRESS NOTES
"Chief Complaint  Neck Pain and Shoulder Pain    SUBJECTIVE  Cecilia Jay presents to [facility]     Patient complaining of neck pain, right shoulder pain since Friday.  Patient states she was raking gravel when she heard a pop and subsequently has had constant pain she describes as burning that increases with any ROM. She reports decreased ROM and she is unable to lift her arm above shoulder level. States she has had some radiation of pain down her arm. Denies numbness/paresthesias.  Patient states she went to [facility] in [place] 8/13/22, right shoulder xray shows no abnormalities.  Patient got a prescription of Medrol Pack from a friend and has been taking that.  Pt has been taking Kamran ASA and medrol dose pack. Tomorrow will be her last day of the dose pack.   History of Present Illness  History reviewed. No pertinent past medical history.   History reviewed. No pertinent family history.   Past Surgical History:   Procedure Laterality Date   • BILATERAL BREAST REDUCTION     • TUBAL ABDOMINAL LIGATION     • WISDOM TOOTH EXTRACTION        No current outpatient medications on file.    OBJECTIVE  Vital Signs:   /81 (BP Location: Left arm, Patient Position: Sitting, Cuff Size: Adult)   Pulse 51   Temp 98.2 °F (36.8 °C) (Oral)   Ht 175.3 cm (69\")   Wt 103 kg (226 lb 9.6 oz)   SpO2 99%   BMI 33.46 kg/m²    Estimated body mass index is 33.46 kg/m² as calculated from the following:    Height as of this encounter: 175.3 cm (69\").    Weight as of this encounter: 103 kg (226 lb 9.6 oz).     Wt Readings from Last 3 Encounters:   08/17/22 103 kg (226 lb 9.6 oz)   08/11/22 102 kg (225 lb)   02/15/22 106 kg (233 lb 6.4 oz)     BP Readings from Last 3 Encounters:   08/17/22 122/81   08/11/22 125/84   02/15/22 108/71       Physical Exam  Vitals reviewed.   Constitutional:       Appearance: Normal appearance. She is well-developed.   HENT:      Head: Normocephalic and " atraumatic.      Right Ear: External ear normal.      Left Ear: External ear normal.      Mouth/Throat:      Pharynx: No oropharyngeal exudate.   Eyes:      Conjunctiva/sclera: Conjunctivae normal.      Pupils: Pupils are equal, round, and reactive to light.   Cardiovascular:      Rate and Rhythm: Normal rate and regular rhythm.      Heart sounds: No murmur heard.    No friction rub. No gallop.   Pulmonary:      Effort: Pulmonary effort is normal.      Breath sounds: Normal breath sounds. No wheezing or rhonchi.   Musculoskeletal:      Right shoulder: Normal.      Left shoulder: Normal.      Cervical back: Pain with movement present. Decreased range of motion.   Skin:     General: Skin is warm and dry.   Neurological:      Mental Status: She is alert and oriented to person, place, and time.      Cranial Nerves: No cranial nerve deficit.   Psychiatric:         Mood and Affect: Mood and affect normal.         Behavior: Behavior normal.         Thought Content: Thought content normal.         Judgment: Judgment normal.          Result Review        XR Knee 1 or 2 View Right    Result Date: 8/11/2022   Negative right knee series.      LAUREN SMALL MD       Electronically Signed and Approved By: LAUREN SMALL MD on 8/11/2022 at 16:37              XR shoulder comp min 2 views right      Narrative  Performed by [facility]  INDICATION:  Posterior shoulder pain after shoveling rock.     TECHNIQUE:  Three views of the right shoulder.     COMPARISON:  None Available.     FINDINGS:     There is no displaced fracture.  The alignment is anatomic. Humeral head and   surgical neck are intact. Glenoid humeral joint alignment is maintained. No soft   tissue abnormality is seen.     IMPRESSION:   No acute fracture or malalignment.       Signed by BRANDON Polo DO   ##### Final #####     Dictated by:    JULES POLO DO-RAD   Dictated DT/TM: 08/13/2022 9:52 am   Interpreted and electronically signed by:  JULES POLO  BRENTON, DO-RAD   Signed DT/TM:  08/13/2022 9:55 am    Procedure Note    Jules Polo DO - 08/13/2022   Formatting of this note might be different from the original.   INDICATION:  Posterior shoulder pain after shoveling rock.     TECHNIQUE:  Three views of the right shoulder.     COMPARISON:  None Available.     FINDINGS:     There is no displaced fracture.  The alignment is anatomic. Humeral head and   surgical neck are intact. Glenoid humeral joint alignment is maintained. No soft   tissue abnormality is seen.     IMPRESSION:   No acute fracture or malalignment.       Signed by BRANDON Polo DO   ##### Final #####     Dictated by:    JULES POLO DO-RAD   Dictated DT/TM: 08/13/2022 9:52 am   Interpreted and electronically signed by:  JULES POLO DO-RAD   Signed DT/TM:  08/13/2022 9:55 am    The above data has been reviewed by IRIS Godinez 08/17/2022 08:58 EDT.          Patient Care Team:  Radha Holman APRN as PCP - General (Family Medicine)        ASSESSMENT & PLAN    Diagnoses and all orders for this visit:    1. Neck pain (Primary)  -     XR Spine Cervical Complete 4 or 5 View; Future    2. Acute pain of right shoulder         Tobacco Use: Medium Risk   • Smoking Tobacco Use: Former Smoker   • Smokeless Tobacco Use: Never Used       Follow Up     Return if symptoms worsen or fail to improve.      Patient was given instructions and counseling regarding her condition or for health maintenance advice. Please see specific information pulled into the AVS if appropriate.   I have reviewed information obtained and documented by others and I have confirmed the accuracy of this documented note.    IRIS Godinez

## 2022-08-18 ENCOUNTER — HOSPITAL ENCOUNTER (OUTPATIENT)
Dept: GENERAL RADIOLOGY | Facility: HOSPITAL | Age: 37
Discharge: HOME | End: 2022-08-18
Admitting: NURSE PRACTITIONER

## 2022-08-18 DIAGNOSIS — M54.2 NECK PAIN: ICD-10-CM

## 2022-08-18 PROCEDURE — 72050 X-RAY EXAM NECK SPINE 4/5VWS: CPT

## 2022-09-13 ENCOUNTER — HOSPITAL ENCOUNTER (OUTPATIENT)
Dept: MRI IMAGING | Facility: HOSPITAL | Age: 37
Discharge: HOME | End: 2022-09-13
Admitting: NURSE PRACTITIONER

## 2022-09-13 DIAGNOSIS — M54.2 NECK PAIN: ICD-10-CM

## 2022-09-13 PROCEDURE — 72141 MRI NECK SPINE W/O DYE: CPT

## 2022-09-14 DIAGNOSIS — M54.2 CERVICAL PAIN (NECK): Primary | ICD-10-CM

## 2022-09-28 ENCOUNTER — OFFICE VISIT (OUTPATIENT)
Dept: NEUROSURGERY | Facility: CLINIC | Age: 37
End: 2022-09-28

## 2022-09-28 VITALS
HEIGHT: 69 IN | WEIGHT: 230.2 LBS | BODY MASS INDEX: 34.1 KG/M2 | DIASTOLIC BLOOD PRESSURE: 86 MMHG | SYSTOLIC BLOOD PRESSURE: 121 MMHG

## 2022-09-28 DIAGNOSIS — M50.30 DEGENERATIVE DISC DISEASE, CERVICAL: ICD-10-CM

## 2022-09-28 DIAGNOSIS — M79.18 CERVICAL MYOFASCIAL PAIN SYNDROME: Primary | ICD-10-CM

## 2022-09-28 DIAGNOSIS — M54.2 CERVICALGIA: ICD-10-CM

## 2022-09-28 DIAGNOSIS — G93.0 ARACHNOID CYST OF POSTERIOR CRANIAL FOSSA: ICD-10-CM

## 2022-09-28 PROCEDURE — 99215 OFFICE O/P EST HI 40 MIN: CPT | Performed by: NURSE PRACTITIONER

## 2022-09-28 RX ORDER — ASPIRIN,CAFFEINE 35.5; 5 MG/1; MG/1
TABLET ORAL
COMMUNITY
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
## 2022-12-09 ENCOUNTER — TELEPHONE (OUTPATIENT)
Dept: FAMILY MEDICINE CLINIC | Facility: CLINIC | Age: 37
End: 2022-12-09

## 2022-12-09 NOTE — TELEPHONE ENCOUNTER
----- Message from Cecilia Jay sent at 12/8/2022  8:22 PM EST -----  Regarding: Massage therapy   Contact: [phone]  Rm Garcia,  I’ve been using massage therapy for my neck and should for over a year now. I use my [employer] card. My therapist said that after the first of the year they are being made to charge sales tax. She said if I have a note from my provider it is good for one year. Is that something you could do?    Thanks,  Cecilia

## 2022-12-09 NOTE — TELEPHONE ENCOUNTER
Phoned patient advising her that Radha is out of the office and will be back in on Dec 199. She stated that she needs the letter in Jan and she will send a my chart message then for her to get the letter because it cannot be dated in 2022.

## 2023-01-11 ENCOUNTER — TELEPHONE (OUTPATIENT)
Dept: FAMILY MEDICINE CLINIC | Facility: CLINIC | Age: 38
End: 2023-01-11
Payer: COMMERCIAL

## 2023-01-11 NOTE — TELEPHONE ENCOUNTER
----- Message from Bushra Ryan sent at 1/11/2023  9:01 AM EST -----  Regarding: FW: Sales Tax, Massage Therapy  Contact: [phone] -hi Radha,  I’ve been using massage therapy for my neck and should for over a year now. I use my [employer] card. My therapist said that after the first of the year they are being made to charge sales tax. She said if I have a note from my provider it is good for one year. Is that something you could do?     Thanks,  Cecilia

## 2023-01-18 NOTE — TELEPHONE ENCOUNTER
I called patient, she states she just needs a letter in [facility] stating that she needs to have the massage therapy for her chronic neck and shoulder pain.

## 2023-04-04 ENCOUNTER — OFFICE VISIT (OUTPATIENT)
Dept: FAMILY MEDICINE CLINIC | Facility: CLINIC | Age: 38
End: 2023-04-04
Payer: COMMERCIAL

## 2023-04-04 VITALS
WEIGHT: 217 LBS | HEART RATE: 56 BPM | DIASTOLIC BLOOD PRESSURE: 69 MMHG | OXYGEN SATURATION: 100 % | BODY MASS INDEX: 32.14 KG/M2 | HEIGHT: 69 IN | SYSTOLIC BLOOD PRESSURE: 111 MMHG

## 2023-04-04 DIAGNOSIS — R40.0 DAYTIME SOMNOLENCE: ICD-10-CM

## 2023-04-04 DIAGNOSIS — Z00.00 ANNUAL PHYSICAL EXAM: Primary | ICD-10-CM

## 2023-04-04 PROCEDURE — 99395 PREV VISIT EST AGE 18-39: CPT | Performed by: NURSE PRACTITIONER

## 2023-04-04 PROCEDURE — 99213 OFFICE O/P EST LOW 20 MIN: CPT | Performed by: NURSE PRACTITIONER
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
## 2023-04-13 PROCEDURE — 87081 CULTURE SCREEN ONLY: CPT | Performed by: STUDENT IN AN ORGANIZED HEALTH CARE EDUCATION/TRAINING PROGRAM

## 2023-04-16 ENCOUNTER — TELEPHONE (OUTPATIENT)
Dept: URGENT CARE | Facility: CLINIC | Age: 38
End: 2023-04-16
Payer: COMMERCIAL

## 2023-04-16 NOTE — TELEPHONE ENCOUNTER
----- Message from IRIS Ackerman sent at 4/16/2023  1:31 PM EDT -----  Please notify patient of negative beta strep test result.

## 2023-07-17 PROBLEM — E66.9 CLASS 1 OBESITY: Status: ACTIVE | Noted: 2023-07-17

## 2023-07-17 PROBLEM — R06.83 SNORING: Status: ACTIVE | Noted: 2023-07-17

## 2023-07-17 PROBLEM — E66.811 CLASS 1 OBESITY: Status: ACTIVE | Noted: 2023-07-17

## 2023-07-17 PROBLEM — G47.30 OBSERVED SLEEP APNEA: Status: ACTIVE | Noted: 2023-07-17

## 2023-07-17 PROBLEM — G47.19 EXCESSIVE DAYTIME SLEEPINESS: Status: ACTIVE | Noted: 2023-07-17

## 2023-07-17 PROBLEM — G47.8 NON-RESTORATIVE SLEEP: Status: ACTIVE | Noted: 2023-07-17

## 2023-07-24 ENCOUNTER — LAB (OUTPATIENT)
Dept: LAB | Facility: HOSPITAL | Age: 38
End: 2023-07-24
Payer: COMMERCIAL

## 2023-07-24 ENCOUNTER — HOSPITAL ENCOUNTER (OUTPATIENT)
Dept: SLEEP MEDICINE | Facility: HOSPITAL | Age: 38
Discharge: HOME | End: 2023-07-24
Admitting: INTERNAL MEDICINE
Payer: COMMERCIAL

## 2023-07-24 DIAGNOSIS — G47.30 OBSERVED SLEEP APNEA: ICD-10-CM

## 2023-07-24 DIAGNOSIS — G47.8 NON-RESTORATIVE SLEEP: ICD-10-CM

## 2023-07-24 DIAGNOSIS — E66.9 CLASS 1 OBESITY: ICD-10-CM

## 2023-07-24 DIAGNOSIS — R06.83 SNORING: ICD-10-CM

## 2023-07-24 DIAGNOSIS — Z00.00 ANNUAL PHYSICAL EXAM: ICD-10-CM

## 2023-07-24 LAB
ALBUMIN SERPL-MCNC: 4.9 G/DL (ref 3.5–5.2)
ALBUMIN/GLOB SERPL: 1.9 G/DL
ALP SERPL-CCNC: 61 U/L (ref 39–117)
ALT SERPL W P-5'-P-CCNC: 18 U/L (ref 1–33)
ANION GAP SERPL CALCULATED.3IONS-SCNC: 11 MMOL/L (ref 5–15)
AST SERPL-CCNC: 18 U/L (ref 1–32)
BASOPHILS # BLD AUTO: 0.03 10*3/MM3 (ref 0–0.2)
BASOPHILS NFR BLD AUTO: 0.6 % (ref 0–1.5)
BILIRUB SERPL-MCNC: 0.3 MG/DL (ref 0–1.2)
BUN SERPL-MCNC: 11 MG/DL (ref 6–20)
BUN/CREAT SERPL: 12.2 (ref 7–25)
CALCIUM SPEC-SCNC: 9.5 MG/DL (ref 8.6–10.5)
CHLORIDE SERPL-SCNC: 104 MMOL/L (ref 98–107)
CHOLEST SERPL-MCNC: 187 MG/DL (ref 0–200)
CO2 SERPL-SCNC: 25 MMOL/L (ref 22–29)
CREAT SERPL-MCNC: 0.9 MG/DL (ref 0.57–1)
DEPRECATED RDW RBC AUTO: 42.3 FL (ref 37–54)
EGFRCR SERPLBLD CKD-EPI 2021: 83.6 ML/MIN/1.73
EOSINOPHIL # BLD AUTO: 0.06 10*3/MM3 (ref 0–0.4)
EOSINOPHIL NFR BLD AUTO: 1.2 % (ref 0.3–6.2)
ERYTHROCYTE [DISTWIDTH] IN BLOOD BY AUTOMATED COUNT: 12.4 % (ref 12.3–15.4)
GLOBULIN UR ELPH-MCNC: 2.6 GM/DL
GLUCOSE SERPL-MCNC: 93 MG/DL (ref 65–99)
HCT VFR BLD AUTO: 41.3 % (ref 34–46.6)
HDLC SERPL-MCNC: 58 MG/DL (ref 40–60)
HGB BLD-MCNC: 13.8 G/DL (ref 12–15.9)
IMM GRANULOCYTES # BLD AUTO: 0.01 10*3/MM3 (ref 0–0.05)
IMM GRANULOCYTES NFR BLD AUTO: 0.2 % (ref 0–0.5)
LDLC SERPL CALC-MCNC: 102 MG/DL (ref 0–100)
LDLC/HDLC SERPL: 1.68 {RATIO}
LYMPHOCYTES # BLD AUTO: 1.84 10*3/MM3 (ref 0.7–3.1)
LYMPHOCYTES NFR BLD AUTO: 37.2 % (ref 19.6–45.3)
MCH RBC QN AUTO: 31 PG (ref 26.6–33)
MCHC RBC AUTO-ENTMCNC: 33.4 G/DL (ref 31.5–35.7)
MCV RBC AUTO: 92.8 FL (ref 79–97)
MONOCYTES # BLD AUTO: 0.44 10*3/MM3 (ref 0.1–0.9)
MONOCYTES NFR BLD AUTO: 8.9 % (ref 5–12)
NEUTROPHILS NFR BLD AUTO: 2.56 10*3/MM3 (ref 1.7–7)
NEUTROPHILS NFR BLD AUTO: 51.9 % (ref 42.7–76)
NRBC BLD AUTO-RTO: 0 /100 WBC (ref 0–0.2)
PLATELET # BLD AUTO: 277 10*3/MM3 (ref 140–450)
PMV BLD AUTO: 11.9 FL (ref 6–12)
POTASSIUM SERPL-SCNC: 4.2 MMOL/L (ref 3.5–5.2)
PROT SERPL-MCNC: 7.5 G/DL (ref 6–8.5)
RBC # BLD AUTO: 4.45 10*6/MM3 (ref 3.77–5.28)
SODIUM SERPL-SCNC: 140 MMOL/L (ref 136–145)
TRIGL SERPL-MCNC: 158 MG/DL (ref 0–150)
TSH SERPL DL<=0.05 MIU/L-ACNC: 1.79 UIU/ML (ref 0.27–4.2)
VLDLC SERPL-MCNC: 27 MG/DL (ref 5–40)
WBC NRBC COR # BLD: 4.94 10*3/MM3 (ref 3.4–10.8)

## 2023-07-24 PROCEDURE — 80050 GENERAL HEALTH PANEL: CPT

## 2023-07-24 PROCEDURE — 95806 SLEEP STUDY UNATT&RESP EFFT: CPT

## 2023-07-24 PROCEDURE — 36415 COLL VENOUS BLD VENIPUNCTURE: CPT

## 2023-07-24 PROCEDURE — 80061 LIPID PANEL: CPT

## 2024-08-12 NOTE — PROGRESS NOTES
"Chief Complaint  Establish Care, Annual Exam, and Obesity (Patient is interested in weight loss medication. )    Subjective          Cecilia Jay presents to [facility]  Obesity  This is a chronic problem. Pertinent negatives include no abdominal pain, anorexia, arthralgias, change in bowel habit, chest pain, chills, congestion, coughing, diaphoresis, fatigue, fever, headaches, joint swelling, myalgias, nausea, neck pain, numbness, rash, sore throat, swollen glands, urinary symptoms, vertigo, visual change, vomiting or weakness.     ADHD: would like to get back on medications     Previous PCP: IRIS Aggarwal  Specialist(s): None  COVID vaccine: Last dose was 2021, Refused booster patient reports she got one in April  Colon cancer screenin2005  Mammogram: 2024  Pap Smear: 02/15/2022      PHQ-2 Depression Screening  Little interest or pleasure in doing things? 0-->not at all   Feeling down, depressed, or hopeless? 0-->not at all   PHQ-2 Total Score 0         Current Outpatient Medications   Medication Instructions    Aspirin-Caffeine (Kamran Back & Body) 500-32.5 MG tablet Oral    Tirzepatide-Weight Management (ZEPBOUND) 2.5 mg, Subcutaneous, Weekly    Viloxazine HCl  mg, Oral, Daily       The following portions of the patient's history were reviewed and updated as appropriate: allergies, current medications, past family history, past medical history, past social history, past surgical history, and problem list.    Objective   Vital Signs:   /83 (BP Location: Left arm, Patient Position: Sitting, Cuff Size: Adult)   Pulse 65   Temp 97.2 °F (36.2 °C) (Temporal)   Ht 175.3 cm (69\")   Wt 100 kg (221 lb)   SpO2 98%   BMI 32.64 kg/m²     BP Readings from Last 3 Encounters:   24 123/83   23 117/77   23 117/82     Wt Readings from Last 3 Encounters:   24 100 kg (221 lb)   23 97.6 kg (215 lb 3.2 oz)   23 " 97.1 kg (214 lb 1.6 oz)     BMI is >= 30 and <35. (Class 1 Obesity). The following options were offered after discussion;: weight loss educational material (shared in after visit summary), exercise counseling/recommendations, and nutrition counseling/recommendations     Physical Exam     Appearance: No acute distress, well-nourished  Head: normocephalic, atraumatic  Eyes: extraocular movements intact, no scleral icterus, no conjunctival injection  Ears, Nose, and Throat: external ears normal, nares patent, moist mucous membranes  Cardiovascular: regular rate and rhythm. no murmurs, rubs, or gallops. no edema  Respiratory: breathing comfortably, symmetric chest rise, clear to auscultation bilaterally. No wheezes, rales, or rhonchi.  Neuro: alert and oriented to time, place, and person. Normal gait  Psych: normal mood and affect     Result Review :   The following data was reviewed by: IRIS Brown on 08/13/2024:  Common labs          2/21/2024    11:31 2/21/2024    11:32   Common Labs   Glucose  92       BUN  18       Creatinine  1.00       EGFR Non  Am  >60.0       EGFR African Am  >60.0       Sodium  140       Potassium  4.6       Chloride  109       Calcium  9.3       Albumin  4.2       Total Bilirubin  0.5       Alkaline Phosphatase  51       AST (SGOT)  18       ALT (SGPT)  32       WBC 6.6        Hemoglobin 13.2        Hematocrit 40.3        Platelets 203        Triglycerides  151       HDL Cholesterol  52       LDL Cholesterol   104       Hemoglobin A1C 5.5           Details          This result is from an external source.                    Lab Results   Component Value Date    RAPSCRN Negative 04/13/2023            Assessment and Plan    Diagnoses and all orders for this visit:    1. Annual physical exam (Primary)  -     TSH Rfx On Abnormal To Free T4  -     Lipid Panel  -     Comprehensive Metabolic Panel  -     CBC & Differential    2. Screening for thyroid disorder  -     TSH Rfx On  Abnormal To Free T4    3. Screening for lipid disorders  -     Lipid Panel    4. Class 1 obesity with serious comorbidity and body mass index (BMI) of 32.0 to 32.9 in adult, unspecified obesity type  Assessment & Plan:  Patient's (Body mass index is 32.64 kg/m².) indicates that they are obese (BMI >30) with health conditions that include none . Weight is newly identified. BMI  is above average; BMI management plan is completed. We discussed low calorie, low carb based diet program, portion control, and increasing exercise.     Orders:  -     Tirzepatide-Weight Management (ZEPBOUND) 2.5 MG/0.5ML solution auto-injector; Inject 0.5 mL under the skin into the appropriate area as directed 1 (One) Time Per Week.  Dispense: 2 mL; Refill: 0    5. Attention deficit hyperactivity disorder (ADHD), unspecified ADHD type  Assessment & Plan:  Psychological condition is  will trial nonstimulant  .  Continue current treatment regimen.  Psychological condition  will be reassessed in 4 weeks.    Orders:  -     Viloxazine HCl  MG capsule sustained-release 24 hr; Take 1 capsule by mouth Daily.  Dispense: 30 capsule; Refill: 2      Advised on diet, physical activity, sunscreen, helmet, texting and driving, etc      Medications Discontinued During This Encounter   Medication Reason    Probiotic Product (PROBIOTIC DAILY PO)     Calcium Carbonate (CALCIUM 500 PO)     Ascorbic Acid (VITAMIN C PO)           Follow Up   Return in about 4 weeks (around 9/10/2024).  Patient was given instructions and counseling regarding her condition or for health maintenance advice. Please see specific information pulled into the AVS if appropriate.       IRIS Brown  08/13/24  13:43 EDT

## 2024-08-13 ENCOUNTER — OFFICE VISIT (OUTPATIENT)
Dept: INTERNAL MEDICINE | Facility: CLINIC | Age: 39
End: 2024-08-13
Payer: COMMERCIAL

## 2024-08-13 VITALS
SYSTOLIC BLOOD PRESSURE: 123 MMHG | TEMPERATURE: 97.2 F | WEIGHT: 221 LBS | BODY MASS INDEX: 32.73 KG/M2 | HEART RATE: 65 BPM | OXYGEN SATURATION: 98 % | DIASTOLIC BLOOD PRESSURE: 83 MMHG | HEIGHT: 69 IN

## 2024-08-13 DIAGNOSIS — Z00.00 ANNUAL PHYSICAL EXAM: Primary | ICD-10-CM

## 2024-08-13 DIAGNOSIS — Z13.29 SCREENING FOR THYROID DISORDER: ICD-10-CM

## 2024-08-13 DIAGNOSIS — E66.9 CLASS 1 OBESITY WITH SERIOUS COMORBIDITY AND BODY MASS INDEX (BMI) OF 32.0 TO 32.9 IN ADULT, UNSPECIFIED OBESITY TYPE: Chronic | ICD-10-CM

## 2024-08-13 DIAGNOSIS — F90.9 ATTENTION DEFICIT HYPERACTIVITY DISORDER (ADHD), UNSPECIFIED ADHD TYPE: Chronic | ICD-10-CM

## 2024-08-13 DIAGNOSIS — Z13.220 SCREENING FOR LIPID DISORDERS: ICD-10-CM

## 2024-08-13 LAB
ALBUMIN SERPL-MCNC: 4.5 G/DL (ref 3.5–5.2)
ALBUMIN/GLOB SERPL: 1.7 G/DL
ALP SERPL-CCNC: 47 U/L (ref 39–117)
ALT SERPL W P-5'-P-CCNC: 17 U/L (ref 1–33)
ANION GAP SERPL CALCULATED.3IONS-SCNC: 8.7 MMOL/L (ref 5–15)
AST SERPL-CCNC: 22 U/L (ref 1–32)
BASOPHILS # BLD AUTO: 0.03 10*3/MM3 (ref 0–0.2)
BASOPHILS NFR BLD AUTO: 0.6 % (ref 0–1.5)
BILIRUB SERPL-MCNC: 0.3 MG/DL (ref 0–1.2)
BUN SERPL-MCNC: 13 MG/DL (ref 6–20)
BUN/CREAT SERPL: 16 (ref 7–25)
CALCIUM SPEC-SCNC: 9.5 MG/DL (ref 8.6–10.5)
CHLORIDE SERPL-SCNC: 107 MMOL/L (ref 98–107)
CHOLEST SERPL-MCNC: 156 MG/DL (ref 0–200)
CO2 SERPL-SCNC: 23.3 MMOL/L (ref 22–29)
CREAT SERPL-MCNC: 0.81 MG/DL (ref 0.57–1)
DEPRECATED RDW RBC AUTO: 42 FL (ref 37–54)
EGFRCR SERPLBLD CKD-EPI 2021: 94.2 ML/MIN/1.73
EOSINOPHIL # BLD AUTO: 0.07 10*3/MM3 (ref 0–0.4)
EOSINOPHIL NFR BLD AUTO: 1.3 % (ref 0.3–6.2)
ERYTHROCYTE [DISTWIDTH] IN BLOOD BY AUTOMATED COUNT: 12.2 % (ref 12.3–15.4)
GLOBULIN UR ELPH-MCNC: 2.6 GM/DL
GLUCOSE SERPL-MCNC: 71 MG/DL (ref 65–99)
HCT VFR BLD AUTO: 38.7 % (ref 34–46.6)
HDLC SERPL-MCNC: 50 MG/DL (ref 40–60)
HGB BLD-MCNC: 13 G/DL (ref 12–15.9)
IMM GRANULOCYTES # BLD AUTO: 0.01 10*3/MM3 (ref 0–0.05)
IMM GRANULOCYTES NFR BLD AUTO: 0.2 % (ref 0–0.5)
LDLC SERPL CALC-MCNC: 78 MG/DL (ref 0–100)
LDLC/HDLC SERPL: 1.46 {RATIO}
LYMPHOCYTES # BLD AUTO: 1.52 10*3/MM3 (ref 0.7–3.1)
LYMPHOCYTES NFR BLD AUTO: 28.9 % (ref 19.6–45.3)
MCH RBC QN AUTO: 31.6 PG (ref 26.6–33)
MCHC RBC AUTO-ENTMCNC: 33.6 G/DL (ref 31.5–35.7)
MCV RBC AUTO: 94.2 FL (ref 79–97)
MONOCYTES # BLD AUTO: 0.49 10*3/MM3 (ref 0.1–0.9)
MONOCYTES NFR BLD AUTO: 9.3 % (ref 5–12)
NEUTROPHILS NFR BLD AUTO: 3.14 10*3/MM3 (ref 1.7–7)
NEUTROPHILS NFR BLD AUTO: 59.7 % (ref 42.7–76)
NRBC BLD AUTO-RTO: 0 /100 WBC (ref 0–0.2)
PLATELET # BLD AUTO: 238 10*3/MM3 (ref 140–450)
PMV BLD AUTO: 11.5 FL (ref 6–12)
POTASSIUM SERPL-SCNC: 4.5 MMOL/L (ref 3.5–5.2)
PROT SERPL-MCNC: 7.1 G/DL (ref 6–8.5)
RBC # BLD AUTO: 4.11 10*6/MM3 (ref 3.77–5.28)
SODIUM SERPL-SCNC: 139 MMOL/L (ref 136–145)
TRIGL SERPL-MCNC: 166 MG/DL (ref 0–150)
TSH SERPL DL<=0.05 MIU/L-ACNC: 1.25 UIU/ML (ref 0.27–4.2)
VLDLC SERPL-MCNC: 28 MG/DL (ref 5–40)
WBC NRBC COR # BLD AUTO: 5.26 10*3/MM3 (ref 3.4–10.8)

## 2024-08-13 PROCEDURE — 80061 LIPID PANEL: CPT | Performed by: NURSE PRACTITIONER

## 2024-08-13 PROCEDURE — 99396 PREV VISIT EST AGE 40-64: CPT | Performed by: NURSE PRACTITIONER

## 2024-08-13 PROCEDURE — 99214 OFFICE O/P EST MOD 30 MIN: CPT | Performed by: NURSE PRACTITIONER

## 2024-08-13 PROCEDURE — 80050 GENERAL HEALTH PANEL: CPT | Performed by: NURSE PRACTITIONER

## 2024-08-13 NOTE — ASSESSMENT & PLAN NOTE
Psychological condition is  will trial nonstimulant  .  Continue current treatment regimen.  Psychological condition  will be reassessed in 4 weeks.

## 2024-08-13 NOTE — ASSESSMENT & PLAN NOTE
Patient's (Body mass index is 32.64 kg/m².) indicates that they are obese (BMI >30) with health conditions that include none . Weight is newly identified. BMI  is above average; BMI management plan is completed. We discussed low calorie, low carb based diet program, portion control, and increasing exercise.    Xolair Counseling:  Patient informed of potential adverse effects including but not limited to fever, muscle aches, rash and allergic reactions.  The patient verbalized understanding of the proper use and possible adverse effects of Xolair.  All of the patient's questions and concerns were addressed.

## 2024-08-14 ENCOUNTER — PRIOR AUTHORIZATION (OUTPATIENT)
Dept: INTERNAL MEDICINE | Facility: CLINIC | Age: 39
End: 2024-08-14
Payer: COMMERCIAL

## 2024-08-14 NOTE — TELEPHONE ENCOUNTER
PA REQUIRED:    Viloxazine HCl  MG capsule sustained-release 24 hr (08/13/2024)     INDEXED VIA ONBASE

## 2024-08-14 NOTE — TELEPHONE ENCOUNTER
PA REQUIRED:    Tirzepatide-Weight Management (ZEPBOUND) 2.5 MG/0.5ML solution auto-injector (08/13/2024)     INDEXED VIA ONBASE

## 2024-08-15 NOTE — TELEPHONE ENCOUNTER
AMERICA Bojorquez -- waiting for Shoshana to addend the office note so I can upload with PA showing patient has tried and failed a stimulant.

## 2024-08-19 ENCOUNTER — PATIENT ROUNDING (BHMG ONLY) (OUTPATIENT)
Dept: INTERNAL MEDICINE | Facility: CLINIC | Age: 39
End: 2024-08-19
Payer: COMMERCIAL

## 2024-08-19 NOTE — PROGRESS NOTES
August 19, 2024    Hello, may I speak with Cecilia Jay?    My name is Elif      I am  with [facility]  [address]  [place] [address] [phone].    Before we get started may I verify your date of birth? 1985    I am calling to officially welcome you to our practice and ask about your recent visit. Is this a good time to talk? My chart message sent for patient rounding.

## 2024-08-20 ENCOUNTER — PATIENT MESSAGE (OUTPATIENT)
Dept: INTERNAL MEDICINE | Facility: CLINIC | Age: 39
End: 2024-08-20
Payer: COMMERCIAL

## 2024-08-20 DIAGNOSIS — G47.00 INSOMNIA, UNSPECIFIED TYPE: Primary | ICD-10-CM

## 2024-08-20 RX ORDER — TRAZODONE HYDROCHLORIDE 50 MG/1
50 TABLET ORAL NIGHTLY
Qty: 30 TABLET | Refills: 1 | Status: SHIPPED | OUTPATIENT
Start: 2024-08-20

## 2024-08-20 NOTE — TELEPHONE ENCOUNTER
From: Cecilia Jay  To: Shoshana Zacarias  Sent: 8/20/2024 10:48 AM EDT  Subject: A few requests     I am not sleeping well since starting my ADD and weight loss prescriptions. Is there something I can take to assist?    We went to fill my daughter’s birth control and it was nearly $200/mo. Is there a generic you can prescribe instead?    Thanks,  Cecilia

## 2024-08-29 ENCOUNTER — PATIENT MESSAGE (OUTPATIENT)
Dept: INTERNAL MEDICINE | Facility: CLINIC | Age: 39
End: 2024-08-29
Payer: COMMERCIAL

## 2024-08-29 DIAGNOSIS — E66.9 CLASS 1 OBESITY WITH SERIOUS COMORBIDITY AND BODY MASS INDEX (BMI) OF 32.0 TO 32.9 IN ADULT, UNSPECIFIED OBESITY TYPE: Chronic | ICD-10-CM

## 2024-09-06 DIAGNOSIS — F90.9 ATTENTION DEFICIT HYPERACTIVITY DISORDER (ADHD), UNSPECIFIED ADHD TYPE: Chronic | ICD-10-CM

## 2024-09-19 ENCOUNTER — OFFICE VISIT (OUTPATIENT)
Dept: INTERNAL MEDICINE | Facility: CLINIC | Age: 39
End: 2024-09-19
Payer: COMMERCIAL

## 2024-09-19 VITALS
BODY MASS INDEX: 29.92 KG/M2 | WEIGHT: 202 LBS | DIASTOLIC BLOOD PRESSURE: 81 MMHG | OXYGEN SATURATION: 95 % | HEART RATE: 82 BPM | TEMPERATURE: 98 F | SYSTOLIC BLOOD PRESSURE: 115 MMHG | HEIGHT: 69 IN

## 2024-09-19 DIAGNOSIS — Z23 NEED FOR INFLUENZA VACCINATION: ICD-10-CM

## 2024-09-19 DIAGNOSIS — E66.3 OVERWEIGHT (BMI 25.0-29.9): ICD-10-CM

## 2024-09-19 DIAGNOSIS — G47.8 NON-RESTORATIVE SLEEP: ICD-10-CM

## 2024-09-19 DIAGNOSIS — F90.9 ATTENTION DEFICIT HYPERACTIVITY DISORDER (ADHD), UNSPECIFIED ADHD TYPE: Primary | ICD-10-CM

## 2024-09-19 PROCEDURE — 99214 OFFICE O/P EST MOD 30 MIN: CPT | Performed by: NURSE PRACTITIONER

## 2024-09-19 RX ORDER — TRAZODONE HYDROCHLORIDE 50 MG/1
50 TABLET, FILM COATED ORAL NIGHTLY
Qty: 90 TABLET | Refills: 1 | Status: SHIPPED | OUTPATIENT
Start: 2024-09-19

## 2024-09-25 ENCOUNTER — PRIOR AUTHORIZATION (OUTPATIENT)
Dept: INTERNAL MEDICINE | Facility: CLINIC | Age: 39
End: 2024-09-25
Payer: COMMERCIAL

## 2024-10-14 ENCOUNTER — PATIENT MESSAGE (OUTPATIENT)
Dept: INTERNAL MEDICINE | Facility: CLINIC | Age: 39
End: 2024-10-14
Payer: COMMERCIAL

## 2024-10-15 ENCOUNTER — TELEPHONE (OUTPATIENT)
Dept: INTERNAL MEDICINE | Facility: CLINIC | Age: 39
End: 2024-10-15

## 2024-10-15 ENCOUNTER — PRIOR AUTHORIZATION (OUTPATIENT)
Dept: INTERNAL MEDICINE | Facility: CLINIC | Age: 39
End: 2024-10-15
Payer: COMMERCIAL

## 2024-12-16 RX ORDER — ATOMOXETINE 40 MG/1
40 CAPSULE ORAL DAILY
Qty: 30 CAPSULE | Refills: 11 | OUTPATIENT
Start: 2024-12-16

## 2025-01-10 DIAGNOSIS — G47.8 NON-RESTORATIVE SLEEP: ICD-10-CM

## 2025-01-10 RX ORDER — TRAZODONE HYDROCHLORIDE 50 MG/1
50 TABLET, FILM COATED ORAL NIGHTLY
Qty: 90 TABLET | Refills: 1 | Status: SHIPPED | OUTPATIENT
Start: 2025-01-10

## 2025-01-13 RX ORDER — ATOMOXETINE 40 MG/1
40 CAPSULE ORAL DAILY
Qty: 30 CAPSULE | Refills: 11 | Status: SHIPPED | OUTPATIENT
Start: 2025-01-13